# Patient Record
Sex: MALE | Race: WHITE | Employment: UNEMPLOYED | URBAN - NONMETROPOLITAN AREA
[De-identification: names, ages, dates, MRNs, and addresses within clinical notes are randomized per-mention and may not be internally consistent; named-entity substitution may affect disease eponyms.]

---

## 2023-01-23 ENCOUNTER — HOSPITAL ENCOUNTER (EMERGENCY)
Age: 58
Discharge: ANOTHER HOSPITAL | End: 2023-01-23
Attending: STUDENT IN AN ORGANIZED HEALTH CARE EDUCATION/TRAINING PROGRAM
Payer: COMMERCIAL

## 2023-01-23 ENCOUNTER — APPOINTMENT (OUTPATIENT)
Dept: GENERAL RADIOLOGY | Age: 58
End: 2023-01-23
Payer: COMMERCIAL

## 2023-01-23 ENCOUNTER — APPOINTMENT (OUTPATIENT)
Dept: CT IMAGING | Age: 58
End: 2023-01-23
Payer: COMMERCIAL

## 2023-01-23 VITALS
HEIGHT: 73 IN | TEMPERATURE: 98.1 F | DIASTOLIC BLOOD PRESSURE: 78 MMHG | WEIGHT: 212 LBS | SYSTOLIC BLOOD PRESSURE: 133 MMHG | HEART RATE: 98 BPM | BODY MASS INDEX: 28.1 KG/M2 | RESPIRATION RATE: 18 BRPM | OXYGEN SATURATION: 93 %

## 2023-01-23 DIAGNOSIS — K52.9 ENTERITIS: ICD-10-CM

## 2023-01-23 DIAGNOSIS — A41.9 SEPTICEMIA (HCC): ICD-10-CM

## 2023-01-23 DIAGNOSIS — R74.8 ELEVATED LIPASE: Primary | ICD-10-CM

## 2023-01-23 LAB
A/G RATIO: 2 (ref 1.1–2.2)
ALBUMIN SERPL-MCNC: 4.8 G/DL (ref 3.4–5)
ALP BLD-CCNC: 69 U/L (ref 40–129)
ALT SERPL-CCNC: 35 U/L (ref 10–40)
ANION GAP SERPL CALCULATED.3IONS-SCNC: 14 MMOL/L (ref 3–16)
AST SERPL-CCNC: 20 U/L (ref 15–37)
BASE EXCESS VENOUS: -4.1 MMOL/L (ref -3–3)
BASOPHILS ABSOLUTE: 0.1 K/UL (ref 0–0.2)
BASOPHILS RELATIVE PERCENT: 0.7 %
BILIRUB SERPL-MCNC: 0.4 MG/DL (ref 0–1)
BILIRUBIN URINE: NEGATIVE
BLOOD, URINE: NEGATIVE
BUN BLDV-MCNC: 20 MG/DL (ref 7–20)
CALCIUM SERPL-MCNC: 10.4 MG/DL (ref 8.3–10.6)
CARBOXYHEMOGLOBIN: 0.9 % (ref 0–1.5)
CHLORIDE BLD-SCNC: 99 MMOL/L (ref 99–110)
CLARITY: CLEAR
CO2: 25 MMOL/L (ref 21–32)
COLOR: YELLOW
CREAT SERPL-MCNC: 0.8 MG/DL (ref 0.9–1.3)
D DIMER: <0.27 UG/ML FEU (ref 0–0.6)
EKG ATRIAL RATE: 105 BPM
EKG DIAGNOSIS: NORMAL
EKG P AXIS: 67 DEGREES
EKG P-R INTERVAL: 152 MS
EKG Q-T INTERVAL: 328 MS
EKG QRS DURATION: 80 MS
EKG QTC CALCULATION (BAZETT): 433 MS
EKG R AXIS: 43 DEGREES
EKG T AXIS: 10 DEGREES
EKG VENTRICULAR RATE: 105 BPM
EOSINOPHILS ABSOLUTE: 0.4 K/UL (ref 0–0.6)
EOSINOPHILS RELATIVE PERCENT: 2.4 %
GFR SERPL CREATININE-BSD FRML MDRD: >60 ML/MIN/{1.73_M2}
GLUCOSE BLD-MCNC: 220 MG/DL (ref 70–99)
GLUCOSE URINE: 250 MG/DL
HCO3 VENOUS: 20.8 MMOL/L (ref 23–29)
HCT VFR BLD CALC: 46.7 % (ref 40.5–52.5)
HEMOGLOBIN: 15.8 G/DL (ref 13.5–17.5)
KETONES, URINE: NEGATIVE MG/DL
LACTIC ACID: 1.9 MMOL/L (ref 0.4–2)
LACTIC ACID: 2.1 MMOL/L (ref 0.4–2)
LEUKOCYTE ESTERASE, URINE: NEGATIVE
LIPASE: 429 U/L (ref 13–60)
LYMPHOCYTES ABSOLUTE: 2.6 K/UL (ref 1–5.1)
LYMPHOCYTES RELATIVE PERCENT: 13.8 %
MCH RBC QN AUTO: 29.6 PG (ref 26–34)
MCHC RBC AUTO-ENTMCNC: 33.8 G/DL (ref 31–36)
MCV RBC AUTO: 87.7 FL (ref 80–100)
METHEMOGLOBIN VENOUS: 0.3 %
MICROSCOPIC EXAMINATION: ABNORMAL
MONOCYTES ABSOLUTE: 0.9 K/UL (ref 0–1.3)
MONOCYTES RELATIVE PERCENT: 4.5 %
NEUTROPHILS ABSOLUTE: 15 K/UL (ref 1.7–7.7)
NEUTROPHILS RELATIVE PERCENT: 78.6 %
NITRITE, URINE: NEGATIVE
O2 SAT, VEN: 76 %
O2 THERAPY: ABNORMAL
PCO2, VEN: 37.9 MMHG (ref 40–50)
PDW BLD-RTO: 12.9 % (ref 12.4–15.4)
PH UA: 5.5 (ref 5–8)
PH VENOUS: 7.36 (ref 7.35–7.45)
PLATELET # BLD: 299 K/UL (ref 135–450)
PMV BLD AUTO: 8 FL (ref 5–10.5)
PO2, VEN: 42.4 MMHG (ref 25–40)
POTASSIUM REFLEX MAGNESIUM: 4.2 MMOL/L (ref 3.5–5.1)
PRO-BNP: 17 PG/ML (ref 0–124)
PROTEIN UA: NEGATIVE MG/DL
RAPID INFLUENZA  B AGN: NEGATIVE
RAPID INFLUENZA A AGN: NEGATIVE
RBC # BLD: 5.32 M/UL (ref 4.2–5.9)
S PYO AG THROAT QL: NEGATIVE
SARS-COV-2, NAAT: NOT DETECTED
SODIUM BLD-SCNC: 138 MMOL/L (ref 136–145)
SPECIFIC GRAVITY UA: 1.02 (ref 1–1.03)
TCO2 CALC VENOUS: 22 MMOL/L
TOTAL PROTEIN: 7.2 G/DL (ref 6.4–8.2)
TROPONIN: <0.01 NG/ML
TROPONIN: <0.01 NG/ML
URINE TYPE: ABNORMAL
UROBILINOGEN, URINE: 0.2 E.U./DL
WBC # BLD: 19 K/UL (ref 4–11)

## 2023-01-23 PROCEDURE — 36415 COLL VENOUS BLD VENIPUNCTURE: CPT

## 2023-01-23 PROCEDURE — 93005 ELECTROCARDIOGRAM TRACING: CPT

## 2023-01-23 PROCEDURE — 87804 INFLUENZA ASSAY W/OPTIC: CPT

## 2023-01-23 PROCEDURE — 80053 COMPREHEN METABOLIC PANEL: CPT

## 2023-01-23 PROCEDURE — 82803 BLOOD GASES ANY COMBINATION: CPT

## 2023-01-23 PROCEDURE — 81003 URINALYSIS AUTO W/O SCOPE: CPT

## 2023-01-23 PROCEDURE — 6360000002 HC RX W HCPCS: Performed by: STUDENT IN AN ORGANIZED HEALTH CARE EDUCATION/TRAINING PROGRAM

## 2023-01-23 PROCEDURE — 71045 X-RAY EXAM CHEST 1 VIEW: CPT

## 2023-01-23 PROCEDURE — 93010 ELECTROCARDIOGRAM REPORT: CPT | Performed by: INTERNAL MEDICINE

## 2023-01-23 PROCEDURE — 84484 ASSAY OF TROPONIN QUANT: CPT

## 2023-01-23 PROCEDURE — 83690 ASSAY OF LIPASE: CPT

## 2023-01-23 PROCEDURE — 6360000004 HC RX CONTRAST MEDICATION: Performed by: STUDENT IN AN ORGANIZED HEALTH CARE EDUCATION/TRAINING PROGRAM

## 2023-01-23 PROCEDURE — 87186 SC STD MICRODIL/AGAR DIL: CPT

## 2023-01-23 PROCEDURE — 6360000002 HC RX W HCPCS

## 2023-01-23 PROCEDURE — 87040 BLOOD CULTURE FOR BACTERIA: CPT

## 2023-01-23 PROCEDURE — 87077 CULTURE AEROBIC IDENTIFY: CPT

## 2023-01-23 PROCEDURE — 87150 DNA/RNA AMPLIFIED PROBE: CPT

## 2023-01-23 PROCEDURE — 85025 COMPLETE CBC W/AUTO DIFF WBC: CPT

## 2023-01-23 PROCEDURE — 96365 THER/PROPH/DIAG IV INF INIT: CPT

## 2023-01-23 PROCEDURE — 99285 EMERGENCY DEPT VISIT HI MDM: CPT

## 2023-01-23 PROCEDURE — 96368 THER/DIAG CONCURRENT INF: CPT

## 2023-01-23 PROCEDURE — 83605 ASSAY OF LACTIC ACID: CPT

## 2023-01-23 PROCEDURE — 87635 SARS-COV-2 COVID-19 AMP PRB: CPT

## 2023-01-23 PROCEDURE — 87880 STREP A ASSAY W/OPTIC: CPT

## 2023-01-23 PROCEDURE — 96361 HYDRATE IV INFUSION ADD-ON: CPT

## 2023-01-23 PROCEDURE — 83880 ASSAY OF NATRIURETIC PEPTIDE: CPT

## 2023-01-23 PROCEDURE — 2580000003 HC RX 258: Performed by: STUDENT IN AN ORGANIZED HEALTH CARE EDUCATION/TRAINING PROGRAM

## 2023-01-23 PROCEDURE — 87081 CULTURE SCREEN ONLY: CPT

## 2023-01-23 PROCEDURE — 74177 CT ABD & PELVIS W/CONTRAST: CPT

## 2023-01-23 PROCEDURE — 85379 FIBRIN DEGRADATION QUANT: CPT

## 2023-01-23 RX ORDER — SEMAGLUTIDE 1.34 MG/ML
INJECTION, SOLUTION SUBCUTANEOUS
COMMUNITY
Start: 2023-01-17

## 2023-01-23 RX ORDER — ONDANSETRON 2 MG/ML
INJECTION INTRAMUSCULAR; INTRAVENOUS
Status: COMPLETED
Start: 2023-01-23 | End: 2023-01-23

## 2023-01-23 RX ORDER — 0.9 % SODIUM CHLORIDE 0.9 %
1000 INTRAVENOUS SOLUTION INTRAVENOUS ONCE
Status: COMPLETED | OUTPATIENT
Start: 2023-01-23 | End: 2023-01-23

## 2023-01-23 RX ORDER — DULOXETIN HYDROCHLORIDE 20 MG/1
40 CAPSULE, DELAYED RELEASE ORAL
COMMUNITY
Start: 2022-11-14

## 2023-01-23 RX ORDER — SODIUM CHLORIDE, SODIUM LACTATE, POTASSIUM CHLORIDE, CALCIUM CHLORIDE 600; 310; 30; 20 MG/100ML; MG/100ML; MG/100ML; MG/100ML
INJECTION, SOLUTION INTRAVENOUS CONTINUOUS
Status: DISCONTINUED | OUTPATIENT
Start: 2023-01-23 | End: 2023-01-23 | Stop reason: HOSPADM

## 2023-01-23 RX ORDER — 0.9 % SODIUM CHLORIDE 0.9 %
1400 INTRAVENOUS SOLUTION INTRAVENOUS ONCE
Status: COMPLETED | OUTPATIENT
Start: 2023-01-23 | End: 2023-01-23

## 2023-01-23 RX ADMIN — CEFEPIME 2000 MG: 2 INJECTION, POWDER, FOR SOLUTION INTRAVENOUS at 02:47

## 2023-01-23 RX ADMIN — SODIUM CHLORIDE 1400 ML: 9 INJECTION, SOLUTION INTRAVENOUS at 02:48

## 2023-01-23 RX ADMIN — VANCOMYCIN HYDROCHLORIDE 1000 MG: 1 INJECTION, POWDER, LYOPHILIZED, FOR SOLUTION INTRAVENOUS at 03:35

## 2023-01-23 RX ADMIN — IOPAMIDOL 75 ML: 755 INJECTION, SOLUTION INTRAVENOUS at 02:56

## 2023-01-23 RX ADMIN — ONDANSETRON HYDROCHLORIDE 4 MG: 2 INJECTION, SOLUTION INTRAMUSCULAR; INTRAVENOUS at 05:12

## 2023-01-23 RX ADMIN — SODIUM CHLORIDE 1000 ML: 9 INJECTION, SOLUTION INTRAVENOUS at 02:03

## 2023-01-23 RX ADMIN — SODIUM CHLORIDE, POTASSIUM CHLORIDE, SODIUM LACTATE AND CALCIUM CHLORIDE: 600; 310; 30; 20 INJECTION, SOLUTION INTRAVENOUS at 05:13

## 2023-01-23 SDOH — ECONOMIC STABILITY: FOOD INSECURITY: WITHIN THE PAST 12 MONTHS, THE FOOD YOU BOUGHT JUST DIDN'T LAST AND YOU DIDN'T HAVE MONEY TO GET MORE.: NEVER TRUE

## 2023-01-23 ASSESSMENT — LIFESTYLE VARIABLES
HOW OFTEN DO YOU HAVE A DRINK CONTAINING ALCOHOL: NEVER
HOW MANY STANDARD DRINKS CONTAINING ALCOHOL DO YOU HAVE ON A TYPICAL DAY: PATIENT DOES NOT DRINK

## 2023-01-23 ASSESSMENT — PAIN - FUNCTIONAL ASSESSMENT: PAIN_FUNCTIONAL_ASSESSMENT: NONE - DENIES PAIN

## 2023-01-23 NOTE — ED NOTES
Report given to EMS. Report called by night shift. Pt left via stretcher with EMS. VSS. No further needs.      Jeanine Duckworth RN  01/23/23 2913

## 2023-01-23 NOTE — ED NOTES
Pt started vomiting at this time.   Gave dose of zofran after receiving verbal order from Dr. Tacos Johnson along with Stu Cavazos, ANNA  01/23/23 0540

## 2023-01-23 NOTE — ED PROVIDER NOTES
Emergency Department Encounter    Patient: Valente Jamison  MRN: [id number]  : 1965  Date of Evaluation: 2023  ED Provider:  Jose J Bird MD    Triage Chief Complaint:   Palpitations (Pt ambulates into ED with complaints of feeling palpitations that started about 1.5 hours ago. Also states some nausea, vomiting, diarrhea, and sore throat.)    [place]:  Valente Jamison is a 65 y.o. male with history of diabetes, coronary artery disease with multiple stents in the past presenting with complaints of tachycardia, chills, nausea vomiting diarrhea and abdominal pain. Patient states symptoms started about 1 to 2 hours prior to presentation. Patient states he became diaphoretic and began having nausea vomiting diarrhea. States he has had 1 episode nonbilious nonbloody vomiting with several episodes of diarrhea. States the diarrhea is nonbloody nonmelanotic. States he has generalized abdominal pain which is mild to moderate, constant, stabbing, worse with palpation without relieving factors. Denies fevers or chills. Denies urinary symptoms including dysuria, increased frequency, urgency, hematuria. Denies chest pain or shortness of breath. Denies lightheadedness or dizziness. States he has a cough but this is baseline for him denies any sputum production. Denies blurred vision, focal neurodeficits, motor or sensory changes. Denies neck or back pain. Denies fevers at home.   Denies alcohol or drug use    ROS - see HPI, below listed is current ROS at time of my eval:  At least 14 systems reviewed, negative other than HPI    Past Medical History:   Diagnosis Date    Congenital heart disease     DM (diabetes mellitus) ([facility] [address]     Erectile dysfunction     Osteoarthritis      Past Surgical History:   Procedure Laterality Date    ANKLE SURGERY Left     CHOLECYSTECTOMY      HERNIA REPAIR      KNEE ARTHROSCOPY      both right and left knees    THUMB AMPUTATION Left      Family History   Problem Relation Age of Onset    Diabetes Mother     Heart Disease Mother     High Blood Pressure Mother      Social History     Socioeconomic History    Marital status:      Spouse name: Not on file    Number of children: Not on file    Years of education: Not on file    Highest education level: Not on file   Occupational History    Not on file   Tobacco Use    Smoking status: Never    Smokeless tobacco: Current   Substance and Sexual Activity    Alcohol use: No    Drug use: No    Sexual activity: Yes   Other Topics Concern    Not on file   Social History Narrative    Not on file     Social Determinants of Health     Financial Resource Strain: Not on file   Food Insecurity: Not on file   Transportation Needs: Not on file   Physical Activity: Not on file   Stress: Not on file   Social Connections: Not on file   Intimate Partner Violence: Not on file   Housing Stability: Not on file     No current facility-administered medications for this encounter.      Current Outpatient Medications   Medication Sig Dispense Refill    insulin aspart (NOVOLOG) 100 UNIT/ML injection pen Inject 15-20 Units into the skin 3 times daily (before meals)      meloxicam (MOBIC) 15 MG tablet Take 15 mg by mouth daily      aspirin 81 MG tablet Take 81 mg by mouth daily      lisinopril (PRINIVIL;ZESTRIL) 10 MG tablet Take 10 mg by mouth daily      omeprazole (PRILOSEC) 20 MG capsule Take 20 mg by mouth daily      carvedilol (COREG) 3.125 MG tablet Take 3.125 mg by mouth 2 times daily (with meals)      prasugrel (EFFIENT) 5 MG TABS Take 5 mg by mouth daily      atorvastatin (LIPITOR) 80 MG tablet Take 80 mg by mouth daily      metFORMIN ER (GLUCOPHAGE-XR) 500 MG XR tablet Take 2 tablets by mouth 2 times daily (with meals) 120 tablet 5    insulin glargine (LANTUS) 100 UNIT/ML injection pen Inject 70 Units into the skin nightly 10 Pen 3     No Known Allergies    Nursing Notes Reviewed    Physical Exam:  Triage VS:    ED Triage Vitals   Enc Vitals Group      BP Pulse       Resp       Temp       Temp src       SpO2       Weight       Height       Head Circumference       Peak Flow       Pain Score       Pain Loc       Pain Edu? Excl. in [address]? My pulse ox interpretation is - normal    General appearance:  No acute distress. Skin:  Warm. Dry. Eye:  Extraocular movements intact. Ears, nose, mouth and throat:  Oral mucosa moist   Neck:  Trachea midline. Extremity:  No swelling. Normal ROM     Heart:  Regular rate and rhythm, normal S1 & S2, no extra heart sounds. Perfusion:  intact  Respiratory:  Lungs clear to auscultation bilaterally. Respirations nonlabored. Abdominal:  Normal bowel sounds. Soft. Nondistended, diffuse discomfort in the abdomen mostly in the epigastric region with voluntary guarding without rebound  Back:  No CVA tenderness to palpation     Neurological:  Alert and oriented times 3. No focal neuro deficits. Psychiatric:  Appropriate    I have reviewed and interpreted all of the currently available lab results from this visit (if applicable):  No results found for this visit on 01/23/23. Radiographs (if obtained):  Radiologist's Report Reviewed:  No results found. EKG (if obtained): (All EKG's are interpreted by myself in the absence of a cardiologist)  Sinus tachycardia, ventricular rate 105, KS interval 152, QRS duration 80, QTc 433, no significant ST elevation or depression there is T wave inversions in lead III and aVF with no prior to compare    MDM:    11year-old male presenting with complaints of tachycardia, palpitations, nausea vomiting diarrhea and abdominal pain. History and be seen above. Patient is tachycardic to 111 on presentation. With moves or exerts himself whatsoever heart rate goes into the 120s. Patient is afebrile satting well on room air. Physical exam can be seen above. Lungs are clear to auscultation other than tachycardia cardiac exam is reassuring.   Patient does have diffuse discomfort in the abdomen mostly in the epigastric region with voluntary guarding without rebound there is no flank pain, no CVA tenderness. EKG can be seen above. Saroj Gibson is nonelevated. Chest x-ray is nonacute. D-dimer is nonelevated. Have low suspicion for PE and do not believe further evaluation is necessary at this time BNP is not elevated and patient does not appear fluid overloaded. Rapid strep, rapid COVID and rapid flu are negative. .  Patient does have a leukocytosis of 19,000. Patient does meet SIRS criteria. Blood cultures were ordered and pending. Lactate is mildly elevated 2.1. Did order 30 cc/kg of ideal body weight of fluids. Broad-spectrum antibiotics. Clinically while awaiting further testing. CMP is overall reassuring. Lipase is elevated at 429. Patient denies significant alcohol use. She has had a cholecystectomy in the past and LFTs are overall reassuring. VBG reveals a pH of 7.35 with a PCO2 of 37. CT abdomen pelvis reveals mildly dilated loops of small bowel with multiple small fluid levels with evidence of liquid stool and fluid levels in the right-sided colon concerning for acute cholecystitis. Do believe patient would benefit from admission given elevated lipase and patient meeting SIRS criteria. Patient states he has physicians at [facility] and would prefer to be admitted to [facility]. discussed with hospitalist who has accepted patient in transfer and patient transferred to their facility for further evaluation and treatment. Clinical Impression:  1. Elevated lipase    2. Enteritis    3. Septicemia ([address]            Comment: Please note this report has been produced using speech recognition software and may contain errors related to that system including errors in grammar, punctuation, and spelling, as well as words and phrases that may be inappropriate. Efforts were made to edit the dictations.         Taran Bunn MD  01/23/23 8018

## 2023-01-24 LAB — REPORT: NORMAL

## 2023-01-26 LAB
BLOOD CULTURE, ROUTINE: ABNORMAL
CULTURE, BLOOD 2: ABNORMAL
CULTURE, BLOOD 2: ABNORMAL
ORGANISM: ABNORMAL
S PYO THROAT QL CULT: ABNORMAL
S PYO THROAT QL CULT: ABNORMAL

## 2023-01-28 NOTE — RESULT ENCOUNTER NOTE
Culture reviewed, patient was transferred to [facility], please contact them to update them of culture results